# Patient Record
Sex: MALE | Race: BLACK OR AFRICAN AMERICAN | NOT HISPANIC OR LATINO | URBAN - METROPOLITAN AREA
[De-identification: names, ages, dates, MRNs, and addresses within clinical notes are randomized per-mention and may not be internally consistent; named-entity substitution may affect disease eponyms.]

---

## 2019-07-15 ENCOUNTER — EMERGENCY (EMERGENCY)
Facility: HOSPITAL | Age: 34
LOS: 1 days | Discharge: LEFT BEFORE DISCHARGE | End: 2019-07-15
Admitting: EMERGENCY MEDICINE

## 2019-07-15 ENCOUNTER — EMERGENCY (EMERGENCY)
Facility: HOSPITAL | Age: 34
LOS: 1 days | Discharge: HOME | End: 2019-07-15
Admitting: EMERGENCY MEDICINE
Payer: COMMERCIAL

## 2019-07-15 VITALS
OXYGEN SATURATION: 98 % | HEART RATE: 83 BPM | SYSTOLIC BLOOD PRESSURE: 126 MMHG | RESPIRATION RATE: 18 BRPM | DIASTOLIC BLOOD PRESSURE: 78 MMHG | WEIGHT: 242.07 LBS | TEMPERATURE: 100 F

## 2019-07-15 VITALS
TEMPERATURE: 100 F | SYSTOLIC BLOOD PRESSURE: 153 MMHG | OXYGEN SATURATION: 99 % | WEIGHT: 242.73 LBS | HEIGHT: 71 IN | HEART RATE: 78 BPM | RESPIRATION RATE: 18 BRPM | DIASTOLIC BLOOD PRESSURE: 93 MMHG

## 2019-07-15 DIAGNOSIS — J06.9 ACUTE UPPER RESPIRATORY INFECTION, UNSPECIFIED: ICD-10-CM

## 2019-07-15 DIAGNOSIS — J02.9 ACUTE PHARYNGITIS, UNSPECIFIED: ICD-10-CM

## 2019-07-15 DIAGNOSIS — R05 COUGH: ICD-10-CM

## 2019-07-15 PROCEDURE — 99283 EMERGENCY DEPT VISIT LOW MDM: CPT

## 2019-07-15 PROCEDURE — 99282 EMERGENCY DEPT VISIT SF MDM: CPT

## 2019-07-15 NOTE — ED PROVIDER NOTE - NSFOLLOWUPCLINICS_GEN_ALL_ED_FT
[facility]  Urology  [address], [address] [place] [address]  Phone: [phone]  Fax:     SABRINA [address] [place] [address]  Family Medicine  [address] [place] [id number]  Phone: [phone]  Fax: [phone]  Follow Up Time:

## 2019-07-15 NOTE — ED PROVIDER NOTE - OBJECTIVE STATEMENT
The pt is a 32 y/o M, who presents to ED c/o dry cough, scratchy throat and "fever" x 1 d.  States cough is dry, non productive, + scratchy / itchy throat, some nasal congestion, states that had a "fever" yest but never checked his temp, has not taken any meds for symptoms.  Also wants "routine sti test", states that usually goes to whatever hosp is convenient, never clinic or pmd, does not want prophylactic tx. Denies cp, sob, n/v/d, dysphagia, earache, penile dc, rash.

## 2019-07-15 NOTE — ED PROVIDER NOTE - NSFOLLOWUPINSTRUCTIONS_ED_ALL_ED_FT
Viral Respiratory Infection    drink lots of fluids - avoid acidic drinks, avoid coffee and alcohol  tylenol or motrin as needed, robitussin if needed for cough  follow up with clinic    A viral respiratory infection is an illness that affects parts of the body used for breathing, like the lungs, nose, and throat. It is caused by a germ called a virus. Symptoms can include runny nose, coughing, sneezing, fatigue, body aches, sore throat, fever, or headache. Over the counter medicine can be used to manage the symptoms but the infection typically goes away on its own in 5 to 10 days.     SEEK IMMEDIATE MEDICAL CARE IF YOU HAVE ANY OF THE FOLLOWING SYMPTOMS: shortness of breath, chest pain, fever over 10 days, or lightheadedness/dizziness.

## 2019-07-15 NOTE — ED ADULT TRIAGE NOTE - CHIEF COMPLAINT QUOTE
Pt CO Cough x2 days and requesting STD screening.  Pt states "I've had a cough, might be a little dehydrated and I'd like an STD screening."  Mask applied in triage.

## 2019-07-15 NOTE — ED PROVIDER NOTE - ENMT, MLM
Airway patent, Nasal mucosa clear. Mouth with normal mucosa. + post nasal drip. Pharynx w/pale erythema, no exudates, no swelling, diffuse cervical lymphadenopathy b/l

## 2019-07-15 NOTE — ED ADULT NURSE NOTE - NSSEPSISSUSPECTED_ED_A_ED
patient was critically ill... Patient was critically ill with a high probability of imminent or life threatening deterioration. Yes

## 2019-07-15 NOTE — ED ADULT NURSE NOTE - OBJECTIVE STATEMENT
The pt is a 34 y/o M, who presents to ED c/o dry cough, scratchy throat and "fever" x 1 d.  States cough is dry, non productive, + scratchy / itchy throat, some nasal congestion, states that had a "fever" yest but never checked his temp, has not taken any meds for symptoms.  Also wants "routine sti test", states that usually goes to whatever hosp is convenient, never clinic or pmd, does not want prophylactic tx. Denies cp, sob, n/v/d, dysphagia, earache, penile dc, rash.
